# Patient Record
Sex: MALE | Race: BLACK OR AFRICAN AMERICAN | NOT HISPANIC OR LATINO | Employment: FULL TIME | URBAN - METROPOLITAN AREA
[De-identification: names, ages, dates, MRNs, and addresses within clinical notes are randomized per-mention and may not be internally consistent; named-entity substitution may affect disease eponyms.]

---

## 2019-08-13 ENCOUNTER — OFFICE VISIT (OUTPATIENT)
Dept: OPTOMETRY | Facility: CLINIC | Age: 29
End: 2019-08-13
Payer: MEDICAID

## 2019-08-13 DIAGNOSIS — H52.13 MYOPIA OF BOTH EYES WITH ASTIGMATISM: Primary | ICD-10-CM

## 2019-08-13 DIAGNOSIS — H02.889 MEIBOMIAN GLAND DYSFUNCTION: ICD-10-CM

## 2019-08-13 DIAGNOSIS — H52.203 MYOPIA OF BOTH EYES WITH ASTIGMATISM: Primary | ICD-10-CM

## 2019-08-13 DIAGNOSIS — H04.129 DRY EYE: ICD-10-CM

## 2019-08-13 PROCEDURE — 92015 DETERMINE REFRACTIVE STATE: CPT | Performed by: OPTOMETRIST

## 2019-08-13 PROCEDURE — 92004 COMPRE OPH EXAM NEW PT 1/>: CPT | Performed by: OPTOMETRIST

## 2019-08-13 ASSESSMENT — REFRACTION_MANIFEST
OS_AXIS: 155
OS_AXIS: 163
OD_SPHERE: -0.75
OD_AXIS: 055
OS_SPHERE: PLANO
OS_CYLINDER: +0.50
OS_CYLINDER: +0.25
OS_SPHERE: -1.00
OD_CYLINDER: +0.50
METHOD_AUTOREFRACTION: 1
OD_AXIS: 077
OD_CYLINDER: +0.50
OD_SPHERE: -0.25

## 2019-08-13 ASSESSMENT — VISUAL ACUITY
OD_SC: 20/25
OD_CC+: -1
OS_CC: 20/40
OS_CC+: -1
CORRECTION_TYPE: GLASSES
METHOD: SNELLEN - LINEAR
OD_CC: 20/20
OS_SC+: -3
OS_CC: 20/20
OS_SC: 20/25
OD_CC: 20/40

## 2019-08-13 ASSESSMENT — TONOMETRY: IOP_METHOD: APPLANATION

## 2019-08-13 ASSESSMENT — REFRACTION_WEARINGRX
OD_CYLINDER: +0.50
OS_CYLINDER: +0.75
OS_AXIS: 165
OD_SPHERE: -0.50
SPECS_TYPE: SVL
OS_SPHERE: -1.25
OD_AXIS: 075

## 2019-08-13 ASSESSMENT — CONF VISUAL FIELD
OS_NORMAL: 1
OD_NORMAL: 1

## 2019-08-13 ASSESSMENT — EXTERNAL EXAM - LEFT EYE: OS_EXAM: NORMAL

## 2019-08-13 ASSESSMENT — SLIT LAMP EXAM - LIDS
COMMENTS: MEIBOMIAN GLAND DYSFUNCTION
COMMENTS: MEIBOMIAN GLAND DYSFUNCTION

## 2019-08-13 ASSESSMENT — CUP TO DISC RATIO
OS_RATIO: 0.4
OD_RATIO: 0.4

## 2019-08-13 ASSESSMENT — EXTERNAL EXAM - RIGHT EYE: OD_EXAM: NORMAL

## 2019-08-13 NOTE — PATIENT INSTRUCTIONS
DRY EYE TREATMENT    I recommend using artificial tears for your dry eye. There are over the counter drops that work well and may be used up to 4x daily. ( systane balance, refresh optive, soothe xp)   If you need more than 4 drops daily, use a preservative free product which come in individual vials which may be used for 24 hours and discarded.     Artificial tears work best as a preventative and not as well after your eyes are starting to bother you.  It may take 4- 6 weeks of using the drops before you notice improvement.  If after that time you are still having problems schedule an appointment for an evaluation and discussion of different treatments.  Dry eyes are a chronic condition and you may have more symptoms at certain times of the year.      Additional recommended treatment:  Warm compresses once to twice daily for 5-10 minutes    Directions for warm soaks  There are few methods for hot compresses. Moisten a washcloth with hot water, or microwave for 10 seconds, being careful to not get the cloth too hot.   Then put the washcloth onto your eyelids for 5 minutes. It will cool quickly so a rice pack or eyemask that can be heated and laid on top of the washcloth will help retain the heat.          Omega 3 fatty acid supplements taken 1-2x daily  Recommend  at least  2000mg omega 3  800 EPA  600 DHA    Blink regularly  Stay hydrated  Increase humidity  Wear sunglasses   Avoid direct exposure to forced air--turn air vents in the car away and keep fans from blowing directly on your face

## 2019-08-13 NOTE — PROGRESS NOTES
Chief Complaint   Patient presents with     Annual Eye Exam     Itchy eyes ou in springtime          Last Eye Exam: 1 year ago  Dilated Previously: Yes    What are you currently using to see?  Glasses for distance only sometimes, last glasses rx ok in distance but ground looks uneven when walking       Distance Vision Acuity: Noticed gradual change in both eyes    Near Vision Acuity: Satisfied with vision while reading  unaided    Eye Comfort: itchy   Do you use eye drops? : Yes: target otc drops? For itchy and redness,likes systane works better  Occupation or Hobbies: drive bus    Alea Harnett Optometric Assistant, A.B.O.C.          Medical, surgical and family histories reviewed and updated 8/13/2019.       OBJECTIVE: See Ophthalmology exam    ASSESSMENT:    ICD-10-CM    1. Myopia of both eyes with astigmatism H52.13 EYE EXAM (SIMPLE-NONBILLABLE)    H52.203 REFRACTION   2. Meibomian gland dysfunction H02.889 EYE EXAM (SIMPLE-NONBILLABLE)     REFRACTION   3. Dry eye H04.129       PLAN:   Artificial tears , warm compresses update prescription   Wants sent to pharmacy    Harriet Umanzor OD

## 2019-08-13 NOTE — LETTER
8/13/2019         RE: Tahir Bright  [address]  [place] [address]        Dear Colleague,    Thank you for referring your patient, Tahir Bright, to the [facility]. Please see a copy of my visit note below.    Chief Complaint   Patient presents with     Annual Eye Exam     Itchy eyes ou in springtime          Last Eye Exam: 1 year ago  Dilated Previously: Yes    What are you currently using to see?  Glasses for distance only sometimes, last glasses rx ok in distance but ground looks uneven when walking       Distance Vision Acuity: Noticed gradual change in both eyes    Near Vision Acuity: Satisfied with vision while reading  unaided    Eye Comfort: itchy   Do you use eye drops? : Yes: target otc drops? For itchy and redness,likes systane works better  Occupation or Hobbies: drive bus    Alea Licha Optometric Assistant, A.B.O.C.          Medical, surgical and family histories reviewed and updated 8/13/2019.       OBJECTIVE: See Ophthalmology exam    ASSESSMENT:    ICD-10-CM    1. Myopia of both eyes with astigmatism H52.13 EYE EXAM (SIMPLE-NONBILLABLE)    H52.203 REFRACTION   2. Meibomian gland dysfunction H02.889 EYE EXAM (SIMPLE-NONBILLABLE)     REFRACTION   3. Dry eye H04.129       PLAN:   Artificial tears , warm compresses update prescription     Harriet Umanzor OD     Again, thank you for allowing me to participate in the care of your patient.        Sincerely,        Harriet Umanzor, OD

## 2019-08-21 ENCOUNTER — APPOINTMENT (OUTPATIENT)
Dept: OPTOMETRY | Facility: CLINIC | Age: 29
End: 2019-08-21
Payer: MEDICAID

## 2019-08-21 PROCEDURE — 92340 FIT SPECTACLES MONOFOCAL: CPT | Performed by: OPTOMETRIST

## 2023-01-11 ENCOUNTER — HOSPITAL ENCOUNTER (EMERGENCY)
Facility: CLINIC | Age: 33
Discharge: HOME | End: 2023-01-11
Attending: EMERGENCY MEDICINE | Admitting: EMERGENCY MEDICINE
Payer: COMMERCIAL

## 2023-01-11 ENCOUNTER — APPOINTMENT (OUTPATIENT)
Dept: GENERAL RADIOLOGY | Facility: CLINIC | Age: 33
End: 2023-01-11
Attending: EMERGENCY MEDICINE
Payer: COMMERCIAL

## 2023-01-11 VITALS
TEMPERATURE: 99.4 F | OXYGEN SATURATION: 100 % | HEART RATE: 78 BPM | DIASTOLIC BLOOD PRESSURE: 92 MMHG | RESPIRATION RATE: 18 BRPM | SYSTOLIC BLOOD PRESSURE: 137 MMHG

## 2023-01-11 DIAGNOSIS — U07.1 INFECTION DUE TO 2019 NOVEL CORONAVIRUS: ICD-10-CM

## 2023-01-11 LAB
FLUAV RNA SPEC QL NAA+PROBE: NEGATIVE
FLUBV RNA RESP QL NAA+PROBE: NEGATIVE
RSV RNA SPEC NAA+PROBE: NEGATIVE
SARS-COV-2 RNA RESP QL NAA+PROBE: POSITIVE

## 2023-01-11 PROCEDURE — 71046 X-RAY EXAM CHEST 2 VIEWS: CPT

## 2023-01-11 PROCEDURE — 87637 SARSCOV2&INF A&B&RSV AMP PRB: CPT | Performed by: EMERGENCY MEDICINE

## 2023-01-11 PROCEDURE — 99284 EMERGENCY DEPT VISIT MOD MDM: CPT | Mod: 25

## 2023-01-11 PROCEDURE — 250N000011 HC RX IP 250 OP 636: Performed by: EMERGENCY MEDICINE

## 2023-01-11 RX ORDER — DEXAMETHASONE 4 MG/1
8 TABLET ORAL ONCE
Status: COMPLETED | OUTPATIENT
Start: 2023-01-11 | End: 2023-01-11

## 2023-01-11 RX ADMIN — DEXAMETHASONE 8 MG: 4 TABLET ORAL at 20:12

## 2023-01-11 ASSESSMENT — ENCOUNTER SYMPTOMS
SORE THROAT: 1
COUGH: 1
VOMITING: 0
FEVER: 1
MYALGIAS: 1
NAUSEA: 0

## 2023-01-12 NOTE — ED PROVIDER NOTES
History     Chief Complaint:  Fever and Pharyngitis (/)       DEMETRIS Bright is a 33 year old male who presents with fever and pharyngitis for the past couple of days. He says that he has been taking his antibiotics that he received 3 days ago in , however last night his fever began going up again. He also complains of a back ache and myalgias. He denies chest pain, rash, nausea, vomiting. He reports a cough productive of clear phlegm.    Independent Historian: The patient.     Review of External Notes: He tested strep positive 3 days ago according to  note from 1/8/23.     ROS:  Review of Systems   Constitutional: Positive for fever.   HENT: Positive for sore throat.    Respiratory: Positive for cough.    Cardiovascular: Negative for chest pain.   Gastrointestinal: Negative for nausea and vomiting.   Musculoskeletal: Positive for myalgias.   Skin: Negative for rash.   All other systems reviewed and are negative.    Allergies:  Seasonal Allergies     Medications:    Penicillin V    Past Medical History:    Back pain  Hearing loss of L ear  Latent tuberculosis infection  Seasonal allergic rhinitis    Past Surgical History:    Tonsillectomy      Family History:    Mother: tuberculosis    Social History:   reports that he has never smoked. He has never used smokeless tobacco. He reports that he does not drink alcohol and does not use drugs.   Patient presents to the ED alone.     Physical Exam     Patient Vitals for the past 24 hrs:   BP Temp Temp src Pulse Resp SpO2   01/11/23 1950 (!) 137/92 99.4  F (37.4  C) Temporal 78 18 100 %      Physical Exam  General: Patient is awake, alert  Head: The scalp, face, and head appear normal  Eyes: The pupils are equal, round, and reactive to light. Conjunctivae and sclerae are normal  ENT: External acoustic canals are normal. The oropharynx is normal without erythema. Uvula is in the midline  Neck: Normal range of motion.   CV: Regular rate and rhythm.   Resp:  Lungs are clear without wheezes or rales. No respiratory distress.   GI: Abdomen is soft, no rigidity, guarding, or rebound. No distension. No tenderness to palpation in any quadrant.     MS: Normal tone. Joints grossly normal without effusions. No asymmetric leg swelling, calf or thigh tenderness.    Skin: No rash or lesions noted. Normal capillary refill noted  Neuro: Speech is normal and fluent. Face is symmetric. Moving all extremities.   Psych:  Normal affect.  Appropriate interactions.    Emergency Department Course     Imaging:  XR Chest 2 Views   Final Result   IMPRESSION: Heart is normal in size. Lungs are clear.         Report per radiology    Laboratory:  Labs Ordered and Resulted from Time of ED Arrival to Time of ED Departure   INFLUENZA A/B & SARS-COV2 PCR MULTIPLEX - Abnormal       Result Value    Influenza A PCR Negative      Influenza B PCR Negative      RSV PCR Negative      SARS CoV2 PCR Positive (*)         Emergency Department Course & Assessments:       Interventions:  Medications   dexamethasone (DECADRON) tablet 8 mg (8 mg Oral Given 1/11/23 2012)      Independent Interpretation (X-rays, CTs, rhythm strip):  Independently reviewed x-ray       Disposition:  The patient was discharged to home.     Impression & Plan      Medical Decision Making:  Tahir Bright is a 33 year old male who presents for evaluation of URI symptoms.  Patient was recently seen at urgent care and was diagnosed with strep throat.  Was started on penicillin.  However was continued to have myalgias, arthralgias and sore throat.  This could be suggestive of possible COVID-19 illness. Overall, He looks well and I see no evidence of bacterial pneumonia, myocarditis or acute CHF nor impending respiratory failure. Doubt pulmonary embolism or ACS. Doubt serious bacterial infection and I would not do basic labs nor blood cultures. No respiratory changes noted  on detailed exam and I am reassured by their lung exam; they  understand this may rapidly change and to return here if they develop fevers more than 102 or progressive respiratory distress.  Discussed self-isolation and close follow up of primary by phone if possible due to outbreak.    Diagnosis:    ICD-10-CM    1. Infection due to 2019 novel coronavirus  U07.1              Scribe Disclosure:  I, Demetri Umanzor, am serving as a scribe at 7:56 PM on 1/11/2023 to document services personally performed by Angel Schmid MD based on my observations and the provider's statements to me.     1/11/2023   Angel Schmid MD Battista, Christopher Joseph, MD  01/12/23 0008

## 2023-01-12 NOTE — ED TRIAGE NOTES
Here for intermittent subjective fever, sore throat, coughing, lower back pain, and joint pain started Friday. Went to urgent care on Sunday, had a positive strep test, prescribed penicillin. ABCs intact.     Triage Assessment     Row Name 01/11/23 1948       Triage Assessment (Adult)    Airway WDL WDL       Respiratory WDL    Respiratory WDL WDL       Cardiac WDL    Cardiac WDL WDL

## 2023-08-08 ENCOUNTER — HOSPITAL ENCOUNTER (EMERGENCY)
Facility: CLINIC | Age: 33
Discharge: HOME | End: 2023-08-08

## 2024-03-04 ENCOUNTER — HOSPITAL ENCOUNTER (EMERGENCY)
Facility: CLINIC | Age: 34
Discharge: HOME | End: 2024-03-04
Attending: EMERGENCY MEDICINE | Admitting: EMERGENCY MEDICINE
Payer: COMMERCIAL

## 2024-03-04 VITALS
HEART RATE: 95 BPM | DIASTOLIC BLOOD PRESSURE: 93 MMHG | OXYGEN SATURATION: 96 % | WEIGHT: 224.65 LBS | TEMPERATURE: 97.4 F | HEIGHT: 69 IN | RESPIRATION RATE: 18 BRPM | BODY MASS INDEX: 33.27 KG/M2 | SYSTOLIC BLOOD PRESSURE: 143 MMHG

## 2024-03-04 DIAGNOSIS — J10.1 INFLUENZA B: ICD-10-CM

## 2024-03-04 LAB
FLUAV RNA SPEC QL NAA+PROBE: NEGATIVE
FLUBV RNA RESP QL NAA+PROBE: POSITIVE
GROUP A STREP BY PCR: NOT DETECTED
RSV RNA SPEC NAA+PROBE: NEGATIVE
SARS-COV-2 RNA RESP QL NAA+PROBE: NEGATIVE

## 2024-03-04 PROCEDURE — 87637 SARSCOV2&INF A&B&RSV AMP PRB: CPT | Performed by: EMERGENCY MEDICINE

## 2024-03-04 PROCEDURE — 87651 STREP A DNA AMP PROBE: CPT | Performed by: EMERGENCY MEDICINE

## 2024-03-04 PROCEDURE — 99283 EMERGENCY DEPT VISIT LOW MDM: CPT

## 2024-03-04 ASSESSMENT — COLUMBIA-SUICIDE SEVERITY RATING SCALE - C-SSRS
1. IN THE PAST MONTH, HAVE YOU WISHED YOU WERE DEAD OR WISHED YOU COULD GO TO SLEEP AND NOT WAKE UP?: NO
2. HAVE YOU ACTUALLY HAD ANY THOUGHTS OF KILLING YOURSELF IN THE PAST MONTH?: NO
6. HAVE YOU EVER DONE ANYTHING, STARTED TO DO ANYTHING, OR PREPARED TO DO ANYTHING TO END YOUR LIFE?: NO

## 2024-03-04 NOTE — ED TRIAGE NOTES
Pt arrives with sore throat and flu like symptoms that started on Wednesday, he arrives with his son who has similar symptoms.     Triage Assessment (Adult)       Row Name 03/04/24 1203          Triage Assessment    Airway WDL WDL        Respiratory WDL    Respiratory WDL WDL        Skin Circulation/Temperature WDL    Skin Circulation/Temperature WDL WDL        Cardiac WDL    Cardiac WDL WDL        Peripheral/Neurovascular WDL    Peripheral Neurovascular WDL WDL        Cognitive/Neuro/Behavioral WDL    Cognitive/Neuro/Behavioral WDL WDL

## 2024-03-04 NOTE — ED PROVIDER NOTES
"  History     Chief Complaint:  Flu Symptoms       The history is provided by the patient.      Tahir Bright is a 34 year old male who presents to the ED for evaluation of sore throat, myalgias, rhinorrhea, and congestion beginning 5 days ago. He also notes some low back pain and mild cough. Denies fever, vomiting, diarrhea, ear pain, rash, and shortness of breath. Patient has been taking Tylenol and used salt with warm water to clear out his sinuses. No measured fevers. No shortness of breath. Patient eating and drinking well. His son is ill with similar symptoms as well. He denies any other symptoms.    Independent Historian:   None - Patient Only    Review of External Notes:  None    Medications:    The patient is currently on no regular medications.     Past Medical History:    LTBI  Hyperlipidemia     Past Surgical History:    Tonsillectomy      Physical Exam   Patient Vitals for the past 24 hrs:   BP Temp Temp src Pulse Resp SpO2 Height Weight   03/04/24 1200 (!) 143/93 97.4  F (36.3  C) Temporal 95 18 96 % 1.753 m (5' 9\") 101.9 kg (224 lb 10.4 oz)        Physical Exam  General: Resting comfortably. Well appearing, nontoxic.   Head:  Scalp, face, and head appear normal  Eyes:  Pupils equal, round, reactive to light     Conjunctivae noninjected and sclera white  ENT:    The nose is normal    Ears/pinnae are normal. Bilateral TMs clear without erythema, bulging, or effusion. Auditory canals normal. MMM. Posterior pharynx clear without swelling, exudates. Mild erythema.  No mucosal lesions, tongue or lip swelling. No tongue elevation. Uvula normal without deviation. Voice normal. No drooling or trismus. MMM.  Neck:  Normal range of motion  CV:  RRR, no M/R/G  Resp:  CTAB, no increased WOB   MSK:  Normal tone.  Skin:  No rash or lesions noted.  Neuro:  Speech is normal and fluent    Moves all extremities spontaneously  Psych: Awake, Alert. Normal affect      Appropriate interactions         Emergency " Department Course   Laboratory:  Labs Ordered and Resulted from Time of ED Arrival to Time of ED Departure   INFLUENZA A/B, RSV, & SARS-COV2 PCR - Abnormal       Result Value    Influenza A PCR Negative      Influenza B PCR Positive (*)     RSV PCR Negative      SARS CoV2 PCR Negative     GROUP A STREPTOCOCCUS PCR THROAT SWAB - Normal    Group A strep by PCR Not Detected        Emergency Department Course & Assessments:             Interventions:  Medications - No data to display     Assessments, Independent Interpretation, Consults/Discussion of Management/Tests:  ED Course as of 03/04/24 1347   Mon Mar 04, 2024   1334 I obtained the history and examined the patient as noted above.        Social Determinants of Health affecting care:  None    Disposition:  The patient was discharged to home.     Impression & Plan    Medical Decision Making:  Tahir Bright is a 34 year old male who presents for evaluation of sore throat, myalgias, and congestion.  This is consistent with influenza. The patient tested positive for influenza B today.  Patient negative for COVID-19 and RSV as well as strep.  No complication or indication for treatment with Tamiflu.  No clinical evidence to suggest pneumonia, pneumonitis.  Patient is not dehydrated.  Vital signs are reassuring. There is no signs of serious bacterial infection such as bacteremia, meningitis, UTI/pyelonephritis, strep pharyngitis, etc.  Return precautions were discussed with patient. The patient's questions were answered and the patient was agreeable with discharge.  I discussed instructions for supportive care at home using OTC medications.    Diagnosis:    ICD-10-CM    1. Influenza B  J10.1            Discharge Medications:  New Prescriptions    No medications on file      Scribe Disclosure:  DIAZ, Taty Bass, am serving as a scribe at 1:33 PM on 3/4/2024 to document services personally performed by Jai Mendoza MD based on my observations and the provider's  statements to me.    3/4/2024   Jai Mendoza MD Daro, Ryan Clay, MD  03/04/24 1014

## 2024-06-12 ENCOUNTER — HOSPITAL ENCOUNTER (EMERGENCY)
Facility: CLINIC | Age: 34
Discharge: HOME | End: 2024-06-12
Attending: EMERGENCY MEDICINE | Admitting: EMERGENCY MEDICINE
Payer: COMMERCIAL

## 2024-06-12 VITALS
OXYGEN SATURATION: 100 % | DIASTOLIC BLOOD PRESSURE: 86 MMHG | SYSTOLIC BLOOD PRESSURE: 145 MMHG | TEMPERATURE: 100.2 F | RESPIRATION RATE: 18 BRPM | HEART RATE: 94 BPM

## 2024-06-12 DIAGNOSIS — U07.1 COVID-19: ICD-10-CM

## 2024-06-12 LAB
FLUAV RNA SPEC QL NAA+PROBE: NEGATIVE
FLUBV RNA RESP QL NAA+PROBE: NEGATIVE
GROUP A STREP BY PCR: NOT DETECTED
RSV RNA SPEC NAA+PROBE: NEGATIVE
SARS-COV-2 RNA RESP QL NAA+PROBE: POSITIVE

## 2024-06-12 PROCEDURE — 99283 EMERGENCY DEPT VISIT LOW MDM: CPT

## 2024-06-12 PROCEDURE — 87637 SARSCOV2&INF A&B&RSV AMP PRB: CPT | Performed by: EMERGENCY MEDICINE

## 2024-06-12 PROCEDURE — 87651 STREP A DNA AMP PROBE: CPT | Performed by: EMERGENCY MEDICINE

## 2024-06-12 RX ORDER — ACETAMINOPHEN 500 MG
500 TABLET ORAL EVERY 4 HOURS PRN
Status: DISCONTINUED | OUTPATIENT
Start: 2024-06-12 | End: 2024-06-12

## 2024-06-12 ASSESSMENT — COLUMBIA-SUICIDE SEVERITY RATING SCALE - C-SSRS
2. HAVE YOU ACTUALLY HAD ANY THOUGHTS OF KILLING YOURSELF IN THE PAST MONTH?: NO
6. HAVE YOU EVER DONE ANYTHING, STARTED TO DO ANYTHING, OR PREPARED TO DO ANYTHING TO END YOUR LIFE?: NO
1. IN THE PAST MONTH, HAVE YOU WISHED YOU WERE DEAD OR WISHED YOU COULD GO TO SLEEP AND NOT WAKE UP?: NO

## 2024-06-12 ASSESSMENT — ACTIVITIES OF DAILY LIVING (ADL): ADLS_ACUITY_SCORE: 33

## 2024-06-12 NOTE — DISCHARGE INSTRUCTIONS
We recommend that you alternate Tylenol and ibuprofen for fever, aches and pains. Take 1000 mg of Tylenol every 6 hours, and 800 mg of ibuprofen every 6 hours.  If you alternate these, you will take one or the other every 3 hours.  Be sure to drink lots of water to stay hydrated and rest.  Please return the emergency department if you develop any new or concerning symptoms.        Discharge Instructions  COVID-19    COVID-19 is the disease caused by a new coronavirus. The virus spreads from person-to-person primarily by droplets when an infected person coughs or sneezes and the droplets are then breathed in by another person.    Symptoms of COVID-19  Many people have no symptoms or mild symptoms.  Symptoms usually appear within a few days, but up to 14-days, after contact with a person with COVID-19.    A mild COVID-19 illness is like a cold and can have fever, cough, sneezing, sore throat, tiredness, headache, and muscle pain.    A moderate COVID-19 illness might include shortness of breath or pneumonia on a chest x-ray.    A severe COVID-19 illness causes significant breathing problems such as low oxygen levels or more serious pneumonia.  Some patients experience loss of taste or smell which is somewhat unique to COVID-19.      Isolation and Quarantine  Testing is recommended for any person with symptoms that could be COVID-19 and often for those exposed to COVID-19. The best way to stop the spread of the virus is to avoid contact with others.    A close contact exposure is being within 6 feet of someone with COVID for 15 minutes.    Isolation refers to sick people staying away from people who are not sick.    A person in quarantine is limiting activity because they were exposed and are waiting to see if they might become sick.    If you test positive for COVID and have no symptoms, you should stay home (isolation) for 5 full days after the day of the test. You should then wear a mask when around others for  another 5 days.    If you test positive for COVID and have mild symptoms, you should stay home (isolation) for at least 5 days after your symptoms began. You can return to normal activities at that time, wearing a mask when around others, for another 5 days as long as your symptoms are improving/resolving and you have been without a fever for 24 hours (without using fever-reducing medicine).    If you test positive for COVID and have more than mild symptoms, you should stay home (isolation) for at least 10 days after your symptoms began. You can return to normal activities at that time as long as your symptoms are improving and you have been without a fever for 24 hours (without using fever-reducing medicine).  For example, if you have a fever and cough for 6 days, you need to stay home 4 more days with no fever for a total of 10 days. Or, if you have a fever and cough for 10 days, you need to stay home one more day with no fever for a total of 11 days.    If you were exposed to COVID and are not vaccinated (or it has been more than six months from your Pfizer or Moderna vaccine or two months from J&J vaccine), you should stay home (quarantine) for 5 days and then wear a mask around others for 5 additional days. A COVID test at day 5 is recommended.    If you were exposed to COVID and are vaccinated (had a booster, had two shots of Pfizer or Moderna vaccine in the last five months, or had J&J vaccine within two months), you do not need to quarantine but should wear a mask around others for 10 days and get a COVID test on day 5.    If you have symptoms but a negative test, you should stay at home until you have mild/improving symptoms and are without fever for 24 hours, using the same judgment you would for when it is safe to return to work/school from strep throat, influenza, or the common cold. If you worsen, you should consider being re-evaluated.    If you are being tested for COVID because of symptoms and your  test is pending, you should stay home until you know your test result.  More details on isolation and quarantine can be found on this website from the CDC:  https://www.cdc.gov/coronavirus/2019-ncov/your-health/quarantine-isolation.html    If I have COVID, how should I protect myself and others?    Do not go to work or school. Have a friend or relative do your shopping. Do not use public transportation (bus, train) or ridesharing (Lyft, Uber).    Separate yourself from other people in your home. As much as possible, you should stay in one room and away from other people in your home. Also, use a separate bathroom, if possible. Avoid handling pets or other animals while sick.     Wear a facemask if you need to be around other people and cover your mouth and nose with a tissue when you cough or sneeze.     Avoid sharing personal household items. You should not share dishes, drinking glasses, forks/knives/spoons, towels, or bedding with other people in your home. After using these items, they should be washed with soap and water. Clean parts of your home that are touched often (doorknobs, faucets, countertops, etc.) daily.     Wash your hands often with soap and water for at least 20 seconds or use an alcohol-based hand  containing at least 60% alcohol.     Avoid touching your face.    Treat your symptoms. You can take Acetaminophen (Tylenol) to treat body aches and fever as needed for comfort. Ibuprofen (Advil or Motrin) can be used as well if you still have symptoms after taking Tylenol. Drink fluids. Rest.    Watch for worsening symptoms such as shortness of breath/difficulty breathing or very severe weakness.    Employers/workplaces are being asked by the Centers for Disease Control (CDC) to not request notes/documentation for you to return to work or prove that you were ill. You may choose to show your employer this paperwork. Also, repeat testing should not be required to return to  work.    Exercise/Sports in rare cases, COVID could affect your heart in a way that makes exercise or participation in sports dangerous.  If you have a mild COVID illness (fever, cough, sore throat, and similar symptoms but no difficulty breathing or abnormalities of the lung): After your COVID symptoms have resolved, wait 14-days before returning to activity.  If you have more than a mild illness (meaning that you have problems with your breathing or lungs) or if you participate in competitive or strenuous activity or have a history of heart disease: Please see your primary doctor/provider prior to return to activity/competition.      Return to the Emergency Department if:    If you are developing worsening breathing, shortness of breath, or feel worse you should seek medical attention.  If you are uncertain, contact your health care provider/clinic. If you need emergency medical attention, call 911 and tell them you have been ill.

## 2024-06-12 NOTE — ED PROVIDER NOTES
"  Emergency Department Note      History of Present Illness     Chief Complaint  Pharyngitis and Generalized Body Aches    HPI  Tahir Bright is a 34 year old male with history of latent TB and hyperlipidemia who presents to the ED for evaluation of pharyngitis and generalized body aches. Patient reports symptom onset 3 days ago with associated pharyngitis, body aches, fever, chills, and \"air\" or \"pressure\" in the ear. Endorses cough and rhinorrhea. His daughter was sick 4 days ago. States she \"felt hot\" but did not have a fever. He has taken Tylenol and ibuprofen for symptom management. Patient is COVID-19 positive in the ED.     Independent Historian  None    Review of External Notes  3/4/24: ED note reviewed    Past Medical History   Medical History and Problem List  Seasonal allergic rhinitis  Hyperlipidemia  Vitamin D deficiency   Latent tuberculosis infection     Medications  The patient is not currently taking any prescribed medications.     Surgical History   C RAD resection tonsil/pillars   Physical Exam   Patient Vitals for the past 24 hrs:   BP Temp Temp src Pulse Resp SpO2   06/12/24 1305 (!) 145/86 -- -- 94 -- 100 %   06/12/24 1102 (!) 154/91 100.2  F (37.9  C) Oral 109 18 99 %     Physical Exam    General: No acute distress  Head: No obvious trauma to head.  Ears, Nose, Throat:  External ears normal. TMs clear bilaterally. Nose normal.  No pharyngeal erythema, swelling or exudate.  Midline uvula. Moist mucus membranes.  Eyes:  Conjunctivae clear.   Neck: Normal range of motion.  Neck supple.   CV: Regular rate and rhythm.  No murmurs.      Respiratory: Effort normal and breath sounds normal.  No wheezing or crackles.   Gastrointestinal: Soft.  No distension. There is no tenderness.  There is no rigidity, no rebound and no guarding.   Musculoskeletal: Normal range of motion.  Non tender extremities to palpations. No lower extremity edema  Neuro: Alert. Moving all extremities appropriately.  Normal " speech.    Skin: Skin is warm and dry.  No rash noted.   Psych: Normal mood and affect. Behavior is normal.      Diagnostics   Lab Results   Labs Ordered and Resulted from Time of ED Arrival to Time of ED Departure   INFLUENZA A/B, RSV, & SARS-COV2 PCR - Abnormal       Result Value    Influenza A PCR Negative      Influenza B PCR Negative      RSV PCR Negative      SARS CoV2 PCR Positive (*)    GROUP A STREPTOCOCCUS PCR THROAT SWAB - Normal    Group A strep by PCR Not Detected       Independent Interpretation  None  ED Course    Medications Administered  Medications - No data to display    Procedures  Procedures     Discussion of Management  None    Social Determinants of Health adding to complexity of care  None    ED Course  ED Course as of 06/12/24 1400   Wed Jun 12, 2024   1244 I obtained history and examined the patient as noted above.      Medical Decision Making / Diagnosis   CMS Diagnoses: None    MIPS     None    [facility]  Tahir Bright is a 34 year old male presenting with sore throat, body aches.  Symptoms are consistent with viral URI.  He is positive for COVID-19.  Influenza A, influenza B, RSV are negative.  He is also negative for group A strep.  Lungs sound clear to auscultation.  He is breathing comfortably on room air, and there is no concern for pneumonia.  He is afebrile, and does not appear clinically dehydrated.  He is encouraged to take Tylenol and ibuprofen for fever and bodyaches.  He is instructed to drink plenty of fluid to stay hydrated.  Return precautions are given and he verbalizes understanding.  He is discharged home in stable condition.    Disposition  The patient was discharged.     ICD-10 Codes:    ICD-10-CM    1. COVID-19  U07.1            Discharge Medications  Discharge Medication List as of 6/12/2024  1:00 PM            Scribe Disclosure:  Lurdes PERALES, am serving as a scribe at 12:49 PM on 6/12/2024 to document services personally performed by Baltazar Hernández MD based on  my observations and the provider's statements to me.        Baltazar Hernández MD  06/12/24 2055

## 2025-05-27 ENCOUNTER — HOSPITAL ENCOUNTER (EMERGENCY)
Facility: CLINIC | Age: 35
Discharge: HOME | End: 2025-05-27
Attending: EMERGENCY MEDICINE | Admitting: EMERGENCY MEDICINE
Payer: COMMERCIAL

## 2025-05-27 VITALS
SYSTOLIC BLOOD PRESSURE: 139 MMHG | OXYGEN SATURATION: 100 % | TEMPERATURE: 98.7 F | DIASTOLIC BLOOD PRESSURE: 83 MMHG | HEART RATE: 67 BPM | BODY MASS INDEX: 33.17 KG/M2 | RESPIRATION RATE: 18 BRPM | WEIGHT: 224.65 LBS

## 2025-05-27 DIAGNOSIS — J02.0 STREP PHARYNGITIS: ICD-10-CM

## 2025-05-27 LAB — S PYO DNA THROAT QL NAA+PROBE: NOT DETECTED

## 2025-05-27 PROCEDURE — 87651 STREP A DNA AMP PROBE: CPT | Performed by: EMERGENCY MEDICINE

## 2025-05-27 PROCEDURE — 99283 EMERGENCY DEPT VISIT LOW MDM: CPT

## 2025-05-27 RX ORDER — AMOXICILLIN 500 MG/1
500 CAPSULE ORAL 3 TIMES DAILY
Qty: 21 CAPSULE | Refills: 0 | Status: SHIPPED | OUTPATIENT
Start: 2025-05-27 | End: 2025-06-03

## 2025-05-27 ASSESSMENT — COLUMBIA-SUICIDE SEVERITY RATING SCALE - C-SSRS
1. IN THE PAST MONTH, HAVE YOU WISHED YOU WERE DEAD OR WISHED YOU COULD GO TO SLEEP AND NOT WAKE UP?: NO
6. HAVE YOU EVER DONE ANYTHING, STARTED TO DO ANYTHING, OR PREPARED TO DO ANYTHING TO END YOUR LIFE?: NO
2. HAVE YOU ACTUALLY HAD ANY THOUGHTS OF KILLING YOURSELF IN THE PAST MONTH?: NO

## 2025-05-27 ASSESSMENT — ACTIVITIES OF DAILY LIVING (ADL): ADLS_ACUITY_SCORE: 41

## 2025-05-27 NOTE — ED PROVIDER NOTES
History     Chief Complaint:  Sore throat        HPI   Tahir Bright is a 35 year old male who presents with sore throat and bodyaches.  He states that he began to have sore throat and bodyaches 2 days ago.  He denies any difficulty swallowing or breathing, chest pain, abdominal pain, nausea, vomiting, other concerns.  Of note, his son was diagnosed with strep throat over the weekend.  Sore throat      Independent Historian:   None    Review of External Notes:   Reviewed 3/11/2025 office visit for comprehensive review of past medical history    ROS:  Review of Systems    Allergies:  Seasonal Allergies     Medications:    amoxicillin (AMOXIL) 500 MG capsule  cholecalciferol (VITAMIN D3) 61104 UNITS capsule  olopatadine (PATANOL) 0.1 % ophthalmic solution  polyethylene glycol-propylene glycol (SYSTANE) 0.4-0.3 % SOLN ophthalmic solution        Past History:    Past Medical History:   Diagnosis Date    Seasonal allergies          Physical Exam     Patient Vitals for the past 24 hrs:  Vitals:    05/27/25 1046   BP: 139/83   Pulse: 67   Resp: 18   Temp: 98.7  F (37.1  C)   TempSrc: Temporal   SpO2: 100%   Weight: 101.9 kg (224 lb 10.4 oz)         Physical Exam  Constitutional: Alert, attentive\  HENT:    Nose normal.    Posterior pharynx shows erythema and 2+ tonsils   Normal midline uvula   No peritonsillar erythema or swelling   No sublingual induration, swelling or tenderness   No trismus   Normal dentition without gingival swelling or caries   Able to extend neck without discomfort   Tolerating secretions and able to breathe supine with ease  Eyes: EOM are normal. Pupils are equal, round, and reactive to light.   CV: regular rate and rhythm; no murmurs, rubs or gallups  Chest: Effort normal and breath sounds normal.   GI:  There is no tenderness. No distension. Normal bowel sounds  MSK: Normal range of motion.   Neurological: Alert, attentive  Skin: Skin is warm and dry.      Emergency Department Course        Results for orders placed or performed during the hospital encounter of 05/27/25   Group A Streptococcus PCR Throat Swab     Status: Normal    Specimen: Throat; Swab   Result Value Ref Range    Group A strep by PCR Not Detected Not Detected    Narrative    The Xpert Xpress Strep A test, performed on the [employer], is a rapid, qualitative in vitro diagnostic test for the detection of Streptococcus pyogenes (Group A ß-hemolytic Streptococcus, Strep A) in throat swab specimens from patients with signs and symptoms of pharyngitis. The Xpert Xpress Strep A test can be used as an aid in the diagnosis of Group A Streptococcal pharyngitis. The assay is not intended to monitor treatment for Group A Streptococcus infections. The Xpert Xpress Strep A test utilizes an automated real-time polymerase chain reaction (PCR) to detect Streptococcus pyogenes DNA.       Emergency Department Course & Assessments:         Disposition:  The patient was discharged to home.     Impression & Plan      Medical Decision Making:  This is a pleasant 35-year-old male who presents for evaluation of sore throat with multiple sick contacts with strep pharyngitis.  His son tested positive over the weekend and his daughter tested positive today.  Exam and history are consistent with strep pharyngitis.  There is no evidence of peritonsillar abscess or more concerning ENT pathology based on today's exam.  He is well-hydrated, well-appearing, and will be safe for discharge and outpatient care.  We had an extended shared decision made conversation regarding his strep testing.  Given high pretest probability, suspect his negative test is false negative.  Plan amoxicillin therapy for the same.  Primary care follow-up in 1 week and return precautions for worse pain, difficulty breathing or swallowing, or any other concerns.      Diagnosis:  Visit Diagnosis, Associated Orders, and Comments     ICD-10-CM    1. Strep pharyngitis  J02.0               Paulo Arguelles MD  05/27/25 0796

## 2025-05-27 NOTE — ED TRIAGE NOTES
Pt here with c/o sore throat and body aches x 3 days. Son dx with strep on Sunday. ABC intact.

## 2025-05-27 NOTE — DISCHARGE INSTRUCTIONS
It was a pleasure taking care of you today. I hope you feel much better soon.  Your evaluation was consistent with strep throat.  Please follow-up with your primary care doctor in 1 week as needed.  Return immediately for worse pain, difficulty swallowing or breathing, or any other concerns.